# Patient Record
Sex: FEMALE | Race: WHITE | Employment: FULL TIME | URBAN - METROPOLITAN AREA
[De-identification: names, ages, dates, MRNs, and addresses within clinical notes are randomized per-mention and may not be internally consistent; named-entity substitution may affect disease eponyms.]

---

## 2019-05-15 ENCOUNTER — APPOINTMENT (OUTPATIENT)
Dept: CT IMAGING | Facility: CLINIC | Age: 44
End: 2019-05-15
Payer: COMMERCIAL

## 2019-05-15 ENCOUNTER — HOSPITAL ENCOUNTER (EMERGENCY)
Facility: CLINIC | Age: 44
Discharge: HOME | End: 2019-05-15
Admitting: PHYSICIAN ASSISTANT
Payer: COMMERCIAL

## 2019-05-15 ENCOUNTER — MEDICAL CORRESPONDENCE (OUTPATIENT)
Dept: HEALTH INFORMATION MANAGEMENT | Facility: CLINIC | Age: 44
End: 2019-05-15

## 2019-05-15 VITALS
WEIGHT: 205.03 LBS | HEIGHT: 64 IN | DIASTOLIC BLOOD PRESSURE: 85 MMHG | TEMPERATURE: 98.3 F | RESPIRATION RATE: 8 BRPM | BODY MASS INDEX: 35 KG/M2 | OXYGEN SATURATION: 98 % | HEART RATE: 60 BPM | SYSTOLIC BLOOD PRESSURE: 148 MMHG

## 2019-05-15 DIAGNOSIS — R51.9 HEADACHE: ICD-10-CM

## 2019-05-15 LAB
ANION GAP SERPL CALCULATED.3IONS-SCNC: 7 MMOL/L (ref 3–14)
B-HCG FREE SERPL-ACNC: <5 IU/L
BASOPHILS # BLD AUTO: 0 10E9/L (ref 0–0.2)
BASOPHILS NFR BLD AUTO: 0.4 %
BUN SERPL-MCNC: 9 MG/DL (ref 7–30)
CALCIUM SERPL-MCNC: 8.4 MG/DL (ref 8.5–10.1)
CHLORIDE SERPL-SCNC: 104 MMOL/L (ref 94–109)
CO2 SERPL-SCNC: 26 MMOL/L (ref 20–32)
CREAT SERPL-MCNC: 0.58 MG/DL (ref 0.52–1.04)
DIFFERENTIAL METHOD BLD: NORMAL
EOSINOPHIL # BLD AUTO: 0.3 10E9/L (ref 0–0.7)
EOSINOPHIL NFR BLD AUTO: 4.2 %
ERYTHROCYTE [DISTWIDTH] IN BLOOD BY AUTOMATED COUNT: 12.7 % (ref 10–15)
GFR SERPL CREATININE-BSD FRML MDRD: >90 ML/MIN/{1.73_M2}
GLUCOSE SERPL-MCNC: 89 MG/DL (ref 70–99)
HCT VFR BLD AUTO: 40.9 % (ref 35–47)
HGB BLD-MCNC: 13.6 G/DL (ref 11.7–15.7)
IMM GRANULOCYTES # BLD: 0 10E9/L (ref 0–0.4)
IMM GRANULOCYTES NFR BLD: 0.2 %
LYMPHOCYTES # BLD AUTO: 2.7 10E9/L (ref 0.8–5.3)
LYMPHOCYTES NFR BLD AUTO: 33.3 %
MCH RBC QN AUTO: 30.3 PG (ref 26.5–33)
MCHC RBC AUTO-ENTMCNC: 33.3 G/DL (ref 31.5–36.5)
MCV RBC AUTO: 91 FL (ref 78–100)
MONOCYTES # BLD AUTO: 0.6 10E9/L (ref 0–1.3)
MONOCYTES NFR BLD AUTO: 7.8 %
NEUTROPHILS # BLD AUTO: 4.4 10E9/L (ref 1.6–8.3)
NEUTROPHILS NFR BLD AUTO: 54.1 %
NRBC # BLD AUTO: 0 10*3/UL
NRBC BLD AUTO-RTO: 0 /100
PLATELET # BLD AUTO: 284 10E9/L (ref 150–450)
POTASSIUM SERPL-SCNC: 3.8 MMOL/L (ref 3.4–5.3)
RBC # BLD AUTO: 4.49 10E12/L (ref 3.8–5.2)
SODIUM SERPL-SCNC: 137 MMOL/L (ref 133–144)
WBC # BLD AUTO: 8.2 10E9/L (ref 4–11)

## 2019-05-15 PROCEDURE — 70498 CT ANGIOGRAPHY NECK: CPT

## 2019-05-15 PROCEDURE — 80048 BASIC METABOLIC PNL TOTAL CA: CPT | Performed by: PHYSICIAN ASSISTANT

## 2019-05-15 PROCEDURE — 85025 COMPLETE CBC W/AUTO DIFF WBC: CPT | Performed by: PHYSICIAN ASSISTANT

## 2019-05-15 PROCEDURE — 84702 CHORIONIC GONADOTROPIN TEST: CPT

## 2019-05-15 PROCEDURE — 99285 EMERGENCY DEPT VISIT HI MDM: CPT | Mod: 25

## 2019-05-15 PROCEDURE — 70450 CT HEAD/BRAIN W/O DYE: CPT

## 2019-05-15 PROCEDURE — 93005 ELECTROCARDIOGRAM TRACING: CPT

## 2019-05-15 PROCEDURE — 25000132 ZZH RX MED GY IP 250 OP 250 PS 637: Performed by: PHYSICIAN ASSISTANT

## 2019-05-15 PROCEDURE — 25000128 H RX IP 250 OP 636: Performed by: PHYSICIAN ASSISTANT

## 2019-05-15 RX ORDER — IOPAMIDOL 755 MG/ML
500 INJECTION, SOLUTION INTRAVASCULAR ONCE
Status: COMPLETED | OUTPATIENT
Start: 2019-05-15 | End: 2019-05-15

## 2019-05-15 RX ORDER — OMEPRAZOLE 10 MG/1
20 CAPSULE, DELAYED RELEASE ORAL DAILY
COMMUNITY

## 2019-05-15 RX ORDER — ACETAMINOPHEN 500 MG
1000 TABLET ORAL ONCE
Status: COMPLETED | OUTPATIENT
Start: 2019-05-15 | End: 2019-05-15

## 2019-05-15 RX ORDER — ACETAMINOPHEN 500 MG
1000 TABLET ORAL EVERY 6 HOURS PRN
COMMUNITY

## 2019-05-15 RX ORDER — IBUPROFEN 200 MG
600 TABLET ORAL EVERY 4 HOURS PRN
COMMUNITY

## 2019-05-15 RX ADMIN — SODIUM CHLORIDE 80 ML: 9 INJECTION, SOLUTION INTRAVENOUS at 18:36

## 2019-05-15 RX ADMIN — IOPAMIDOL 70 ML: 755 INJECTION, SOLUTION INTRAVENOUS at 18:36

## 2019-05-15 RX ADMIN — ACETAMINOPHEN 1000 MG: 500 TABLET, FILM COATED ORAL at 19:03

## 2019-05-15 SDOH — HEALTH STABILITY: MENTAL HEALTH: HOW OFTEN DO YOU HAVE A DRINK CONTAINING ALCOHOL?: NEVER

## 2019-05-15 ASSESSMENT — ENCOUNTER SYMPTOMS
DIZZINESS: 0
DIAPHORESIS: 0
VOMITING: 0
CHILLS: 0
LIGHT-HEADEDNESS: 0
PHOTOPHOBIA: 0
HEADACHES: 1
NAUSEA: 0
SPEECH DIFFICULTY: 0
FEVER: 0

## 2019-05-15 ASSESSMENT — MIFFLIN-ST. JEOR: SCORE: 1580

## 2019-05-15 NOTE — ED PROVIDER NOTES
"  History     Chief Complaint:  Headache    HPI   Cielo Ramsey is a 43 year old female who presents with a worst headache of life that started on Saturday morning.  The patient states that she was having intercourse when it started suddenly.  She had multiple episodes of vomiting after the headache began along with diaphoresis and chills.  Headache has persisted since this time but is lessened.  It is now a \"throbbing\" on the left temporal side of the head.  She does not feel she has had similar headaches in the past.  Patient also endorses some mild left ear pain today and some posterior neck stiffness. She denies any recent head trauma. Patient notes that the vomiting, diaphoresis, and chills have since subsided. She denies any dizziness, visual changes, fever, speech difficulty, gait abnormalities.  She has no significant medical problems.  No history of hypertension.  She is been taking Tylenol and ibuprofen at home for pain control.  She is not on blood thinners.  She last took ibuprofen at 0930 this morning. Pain currently rated to 4/10. Patient was evaluated at urgent care on Sunday and was at her primary clinic earlier today for this headache. Patient was sent from primary clinic today.    Allergies:  No known drug allergies.     Medications:    Prilosec    Past Medical History:    The patient does not have any past pertinent medical history.    Past Surgical History:    Hysterectomy    Family History:    History reviewed. No pertinent family history.     Social History:  Smoking Status: Current Smoker  Alcohol Use: Yes    Review of Systems   Constitutional: Negative for chills, diaphoresis and fever.   HENT: Positive for ear pain (left).    Eyes: Negative for photophobia and visual disturbance.   Gastrointestinal: Negative for nausea and vomiting.   Musculoskeletal: Negative for gait problem.   Neurological: Positive for headaches. Negative for dizziness, speech difficulty and light-headedness.   All " "other systems reviewed and are negative.    Physical Exam     Patient Vitals for the past 24 hrs:   BP Temp Temp src Pulse Heart Rate Resp SpO2 Height Weight   05/15/19 1957 148/85 -- -- -- -- -- -- -- --   05/15/19 1830 -- -- -- -- 61 8 -- -- --   05/15/19 1815 148/85 -- -- 60 66 10 -- -- --   05/15/19 1800 (!) 128/94 -- -- 67 63 15 -- -- --   05/15/19 1745 154/90 -- -- 60 61 15 -- -- --   05/15/19 1730 149/88 -- -- 60 74 10 -- -- --   05/15/19 1715 (!) 218/105 -- -- -- -- -- -- -- --   05/15/19 1651 (!) 187/97 -- -- -- -- -- -- -- --   05/15/19 1647 -- 98.3  F (36.8  C) Oral -- 76 20 98 % 1.626 m (5' 4\") 93 kg (205 lb 0.4 oz)     Physical Exam  General: Alert and cooperative with exam. Resting comfortably on gurney  Head:  Scalp is NC/AT  Eyes:  No scleral icterus, PERRL  ENT:  The external nose and ears are normal.   Neck:  Normal range of motion without rigidity.  CV:  Regular rate and rhythm    No pathologic murmur, rubs, or gallops.  Resp:  Breath sounds are clear bilaterally.  No crackles, wheezes, rhonchi.    Non-labored, no retractions or accessory muscle use  GI:  Abdomen is soft, no distension, no tenderness, no masses. No peritoneal signs.  Bowel sounds present in all quadrant.  MS:  No lower extremity edema or asymmetric calf swelling.  Skin:  Warm and dry, No rash or lesions noted.  Neuro: Oriented. No gross motor deficits.    Strength and sensation grossly intact in all 4 extremities.  Cranial nerves 2-12 intact. GCS: 15. Normal finger to nose and heel to shin testing.  Gait normal  Psych: Awake. Alert. Normal affect. Appropriate interactions.    Emergency Department Course   ECG (17:07:01):  Rate 61 bpm. CT interval 118. QRS duration 84. QT/QTc 468/471. P-R-T axes 7 70 67. Normal sinus rhythm. Normal ECG. Interpreted at 1710 by Heriberto Shafer MD.    Imaging:  Radiographic findings were communicated with the patient who voiced understanding of the findings.    CT Head w/o Contrast  IMPRESSION: No evidence " of acute intracranial hemorrhage, mass, or  herniation.  As read by radiology.    CTA Head Neck with Contrast  IMPRESSION: Patent arteries in the head and neck without vascular  cutoff. No evidence of dissection. No aneurysm identified. No  significant stenosis.  As read by radiology.    Laboratory:  CBC: WNL (WBC 8.2, HGB 13.6, )  BMP: Calcium 8.4 (L) o/w WNL (Creatinine 0.58)  ISTAT HCG quant POCT: < 5.0    Interventions:  Medications   0.9% sodium chloride BOLUS (0 mLs Intravenous Stopped 5/15/19 1842)   iopamidol (ISOVUE-370) solution 500 mL (70 mLs Intravenous Given 5/15/19 1836)   acetaminophen (TYLENOL) tablet 1,000 mg (1,000 mg Oral Given 5/15/19 1903)   3: Tylenol 1,000 mg tablet PO    Emergency Department Course:  Past medical records, nursing notes, and vitals reviewed.  : I performed an exam of the patient and obtained history, as documented above.    IV inserted and blood drawn. The patient was placed on continuous cardiac monitoring and pulse oximetry.    The patient was sent for CT and CTA imaging while in the emergency department, findings above.  Dr. Brizuela evaluated the patient.  I rechecked the patient. Patient feels improved following tylenol. I discussed the findings and plan with the patient.  The patient was discharged home with return precautions and neurology follow-up.    Impression & Plan      Medical Decision Makin-year-old female who presents following with headache.  Patient history and records reviewed. A broad differential was considered including: subarachnoid hemorrhage, CNS tumor, venous thrombosis, CO poisoning, stroke, dissection, CSF leak, meningitis, glaucoma. On examination, the patient is very well-appearing with no focal neurologic deficits.  She is alert and oriented.  There is no obvious nuchal rigidity on examination.  A CT and CT angiogram were reassuring with no evidence of bleed or aneurysm or dissection.  There additionally were no masses seen.   Given the duration of time that is passed since the onset of her symptoms, the possibility of non-aneurysmal subarachnoid hemorrhage cannot completely be excluded based on CT imaging alone.  We did discuss that a lumbar puncture would be required to exclude this, and discussed the risks versus benefits associated with this and at this time the patient prefers to forego this and follow-up with neurology as an outpatient for further evaluation.  I think this is reasonable given her negative imaging absence of aneurysm on CTA, no focal neurologic deficits, and well appearance.  She feels improved following Tylenol here in the emergency department.  I think other emergent cause of headache is very unlikely at this time.  Recommended Tylenol as needed for symptoms, and will follow-up with neurology in the next several days.  I stressed the importance of returning to the ED immediately if she develops new or worsening symptoms, if headache is worsening, further episodes of vomiting, numbness, weakness, vision changes etc.  Diagnosis:    ICD-10-CM    1. Headache R51        Disposition:  discharged to home    Discharge Medications:     Medication List      There are no discharge medications for this visit.           Kelli Teran  5/15/2019   [facility] EMERGENCY DEPARTMENT  I, Kelli Teran, am serving as a scribe at 6:05 PM on 5/15/2019 to document services personally performed by Mau Cary PA-C based on my observations and the provider's statements to me.            Mau Cary PA-C  05/15/19 2054

## 2019-05-15 NOTE — ED TRIAGE NOTES
"Sudden onset severe HA \"worst headache ever\" with emesis x 4 Saturday during intercourse. Denies hx. Current HA L lateral side of head, worsening with cough.  No emesis since. Alternating tylenol 0530 1000mg and ibuprofen 600mg 0930 without relief. Denies CP, SOB, vision changing, hearing changes. ABC in tact. A/OX4  "

## 2019-05-16 LAB — INTERPRETATION ECG - MUSE: NORMAL

## 2019-05-16 NOTE — ED PROVIDER NOTES
"Emergency Department Attending Supervision Note  5/15/2019  7:40 PM      I evaluated this patient in conjunction with Mau Cary PA-C.      Briefly, the patient presented with a headache. On Saturday morning the patient states she was having intercourse when she suddenly started experiencing a frontal headache.  She reports accompanying vomiting, diaphoresis, and chills that followed but eventually resolved. On Sunday the patient was evaluated at urgent care and was seen at primary clinic today who referred her to the ED. Here, the patient reports that the headache has persisted since Saturday, however, the severity has lessened. It is now a \"throbbing\" on the left side of the head primarily. The patient states she has headaches similar in past though this is lasting longer in duration. Today the patient also reports mild left ear pain and posterior neck pain. She denies any recent head trauma. Patient reports that the vomiting, diaphoresis, and chills have subsided. She denies any dizziness, visual changes, fever, speech difficulty and gait abnormalities. The patient has no significant medical problems or history of hypertension. She has been using Tylenol and Ibuprofen at home for pain control and last dose of ibuprofen was taken at 0930 this morning.       On my exam,  General: Well-nourished, nontoxic  Eyes: PERRL, EOMI, no nystagmus.  No scleral icterus or conjunctival injection.    ENT:  Moist mucus membranes, posterior oropharynx clear without erythema or exudates. TM normal bilaterally. No mastoid tenderness bilaterally. No temporal artery tenderness.   Neck: Supple with full range of motion  Respiratory:  Lungs clear to auscultation bilaterally, no crackles/rubs/wheezes.  Good air movement  CV: Normal rate and rhythm, no murmurs/rubs/gallops  GI:  Abdomen soft and non-distended.  No tenderness, guarding or rebound  Skin: Warm, dry.  No rashes or petechiae  MSK: No peripheral edema or calf " tenderness  Neuro: Alert and oriented to person/place/time.  No aphasia/facial droop/dysarthria.  Tongue midline, normal strength at SCM/trapezius/BUE/BLE.  Normal finger to nose. Normal gait.  Negative romberg, sensation intact over face/BUE/BLE  Psychiatric: Normal affect    Results:  Labs Ordered and Resulted from Time of ED Arrival Up to the Time of Departure from the ED   BASIC METABOLIC PANEL - Abnormal; Notable for the following components:       Result Value    Calcium 8.4 (*)     All other components within normal limits   CBC WITH PLATELETS DIFFERENTIAL   ISTAT HCG QUANTITATIVE PREGNANCY POCT     CTA Head Neck with Contrast   Final Result   IMPRESSION: Patent arteries in the head and neck without vascular   cutoff. No evidence of dissection. No aneurysm identified. No   significant stenosis.      ELIJAH NORTON MD      CT Head w/o Contrast   Final Result   IMPRESSION: No evidence of acute intracranial hemorrhage, mass, or   herniation.      ELIJAH NORTON MD          ED course:    Cielo Ramsey is a 43 year old female presenting with reported headache. She is nontoxic and neurologically intact on arrival. She underwent a formal head CT and CTA given concern for possible subarachnoid hemorrhage/CVA/tumor though this was fortunately unremarkable.  Her labs are reassuring. The patient is not toxic appearing or meningeal. There is no evidence to suggest mastoiditis or temporal arteritis based on exam.  She reported symptom improvement during her time in the ED.  I discussed workup with patient and stated that we cannot completely exclude possible SAH without formal LP given time frame of symptomatology though she is declining at this point in time and clinically I have a lower suspicion.  Risks and benefits of this procedure were discussed as was discussion on missed/delayed diagnoses.  Plans for close outpatient follow-up. Return precautions given.         Diagnosis    ICD-10-CM    1. Headache R51           Janice Brizuela DO    Scribe Disclosure:  I, Sima Isabella, am serving as a scribe on 5/15/2019 at 7:50 PM to personally document services performed by Janice Brizuela DO based on my observations and the provider's statements to me.          Janice Brizuela DO  05/15/19 220

## 2020-01-08 NOTE — ED AVS SNAPSHOT
[facility] Emergency Department  [address]  [facility] [address]  Phone:  [phone]  Fax:  [phone]                                    Cielo Ramsey   MRN: [id number]    Department:  [facility] Emergency Department   Date of Visit:  5/15/2019           After Visit Summary Signature Page    I have received my discharge instructions, and my questions have been answered. I have discussed any challenges I see with this plan with the nurse or doctor.    ..........................................................................................................................................  Patient/Patient Representative Signature      ..........................................................................................................................................  Patient Representative Print Name and Relationship to Patient    ..................................................               ................................................  Date                                   Time    ..........................................................................................................................................  Reviewed by Signature/Title    ...................................................              ..............................................  Date                                               Time          22EPIC Rev 08/18        right breast lumpectomy with needle localization